# Patient Record
Sex: FEMALE | URBAN - METROPOLITAN AREA
[De-identification: names, ages, dates, MRNs, and addresses within clinical notes are randomized per-mention and may not be internally consistent; named-entity substitution may affect disease eponyms.]

---

## 2022-08-09 ENCOUNTER — OFFICE VISIT (OUTPATIENT)
Dept: URBAN - METROPOLITAN AREA CLINIC 46 | Facility: CLINIC | Age: 82
End: 2022-08-09
Payer: MEDICARE

## 2022-08-09 DIAGNOSIS — H16.223 KERATOCONJUNCTIVITIS SICCA, BILATERAL: ICD-10-CM

## 2022-08-09 DIAGNOSIS — H35.3131 NONEXUDATIVE MACULAR DEGENERATION, EARLY DRY STAGE, BILATERAL: ICD-10-CM

## 2022-08-09 DIAGNOSIS — H26.493 OTHER SECONDARY CATARACT, BILATERAL: ICD-10-CM

## 2022-08-09 DIAGNOSIS — H40.1131 PRIMARY OPEN-ANGLE GLAUCOMA, MILD STAGE, BILATERAL: Primary | ICD-10-CM

## 2022-08-09 PROCEDURE — 99214 OFFICE O/P EST MOD 30 MIN: CPT | Performed by: OPHTHALMOLOGY

## 2022-08-09 PROCEDURE — 92134 CPTRZ OPH DX IMG PST SGM RTA: CPT | Performed by: OPHTHALMOLOGY

## 2022-08-09 RX ORDER — LATANOPROST 50 UG/ML
0.005 % SOLUTION/ DROPS OPHTHALMIC
Refills: 0 | Status: INACTIVE
Start: 2022-08-09 | End: 2022-08-09

## 2022-08-09 RX ORDER — METOPROLOL SUCCINATE 100 MG/1
100 MG TABLET, FILM COATED, EXTENDED RELEASE ORAL
Refills: 0 | Status: ACTIVE
Start: 2022-08-09

## 2022-08-09 RX ORDER — LISINOPRIL 40 MG/1
40 MG TABLET ORAL
Refills: 0 | Status: ACTIVE
Start: 2022-08-09

## 2022-08-09 RX ORDER — LATANOPROST 50 UG/ML
0.005 % SOLUTION/ DROPS OPHTHALMIC
Qty: 2.5 | Refills: 11 | Status: ACTIVE
Start: 2022-08-09

## 2022-08-09 ASSESSMENT — INTRAOCULAR PRESSURE
OD: 12
OS: 11
OS: 12
OD: 13

## 2022-08-09 NOTE — IMPRESSION/PLAN
Impression: Keratoconjunctivitis sicca, bilateral: N63.264. Plan: Patient advised that dry eyes may be the cause of symptoms. Advised to use artificial tears as needed.

## 2022-08-09 NOTE — IMPRESSION/PLAN
Impression: Nonexudative macular degeneration, early dry stage, bilateral: H35.7534. Plan: Patient advised that their macular degeneration appears stable. They are advised to continue AREDS/AREDS2 and the [employer] grid. We will continue to monitor periodically; call if any changes noted.

## 2022-11-18 ENCOUNTER — OFFICE VISIT (OUTPATIENT)
Dept: URBAN - METROPOLITAN AREA CLINIC 46 | Facility: CLINIC | Age: 82
End: 2022-11-18
Payer: MEDICARE

## 2022-11-18 DIAGNOSIS — H40.1131 PRIMARY OPEN-ANGLE GLAUCOMA, MILD STAGE, BILATERAL: Primary | ICD-10-CM

## 2022-11-18 DIAGNOSIS — H26.493 OTHER SECONDARY CATARACT, BILATERAL: ICD-10-CM

## 2022-11-18 DIAGNOSIS — H16.223 KERATOCONJUNCTIVITIS SICCA, BILATERAL: ICD-10-CM

## 2022-11-18 PROCEDURE — 99212 OFFICE O/P EST SF 10 MIN: CPT | Performed by: OPHTHALMOLOGY

## 2022-11-18 ASSESSMENT — INTRAOCULAR PRESSURE
OS: 10
OS: 18
OD: 17
OD: 14

## 2022-11-18 NOTE — IMPRESSION/PLAN
Impression: Keratoconjunctivitis sicca, bilateral: D24.330. Plan: Patient advised that dry eyes may be the cause of symptoms. Advised to use artificial tears as needed.

## 2023-02-17 ENCOUNTER — OFFICE VISIT (OUTPATIENT)
Dept: URBAN - METROPOLITAN AREA CLINIC 46 | Facility: CLINIC | Age: 83
End: 2023-02-17
Payer: MEDICARE

## 2023-02-17 DIAGNOSIS — H40.1131 PRIMARY OPEN-ANGLE GLAUCOMA, MILD STAGE, BILATERAL: Primary | ICD-10-CM

## 2023-02-17 DIAGNOSIS — H16.223 KERATOCONJUNCTIVITIS SICCA, BILATERAL: ICD-10-CM

## 2023-02-17 DIAGNOSIS — H26.493 OTHER SECONDARY CATARACT, BILATERAL: ICD-10-CM

## 2023-02-17 PROCEDURE — 99213 OFFICE O/P EST LOW 20 MIN: CPT | Performed by: OPHTHALMOLOGY

## 2023-02-17 ASSESSMENT — INTRAOCULAR PRESSURE
OS: 12
OD: 12

## 2023-02-17 NOTE — IMPRESSION/PLAN
Impression: Keratoconjunctivitis sicca, bilateral: V55.024. Plan: Patient advised that dry eyes may be the cause of symptoms. Advised to use artificial tears as needed.

## 2023-05-05 ENCOUNTER — TESTING ONLY (OUTPATIENT)
Dept: URBAN - METROPOLITAN AREA CLINIC 46 | Facility: CLINIC | Age: 83
End: 2023-05-05
Payer: MEDICARE

## 2023-05-05 DIAGNOSIS — H40.1131 PRIMARY OPEN-ANGLE GLAUCOMA, BILATERAL, MILD STAGE: Primary | ICD-10-CM

## 2023-05-12 ENCOUNTER — OFFICE VISIT (OUTPATIENT)
Dept: URBAN - METROPOLITAN AREA CLINIC 46 | Facility: CLINIC | Age: 83
End: 2023-05-12
Payer: MEDICARE

## 2023-05-12 DIAGNOSIS — H02.834 DERMATOCHALASIS OF LEFT UPPER EYELID: ICD-10-CM

## 2023-05-12 DIAGNOSIS — H02.831 DERMATOCHALASIS OF RIGHT UPPER EYELID: ICD-10-CM

## 2023-05-12 DIAGNOSIS — H40.1131 PRIMARY OPEN-ANGLE GLAUCOMA, BILATERAL, MILD STAGE: Primary | ICD-10-CM

## 2023-05-12 PROCEDURE — 99213 OFFICE O/P EST LOW 20 MIN: CPT | Performed by: OPHTHALMOLOGY

## 2023-05-12 ASSESSMENT — INTRAOCULAR PRESSURE
OD: 14
OS: 12

## 2023-06-08 ENCOUNTER — TESTING ONLY (OUTPATIENT)
Dept: URBAN - METROPOLITAN AREA CLINIC 46 | Facility: CLINIC | Age: 83
End: 2023-06-08
Payer: MEDICARE

## 2023-06-08 DIAGNOSIS — H02.834 DERMATOCHALASIS OF LEFT UPPER EYELID: Primary | ICD-10-CM

## 2023-06-08 PROCEDURE — 92285 EXTERNAL OCULAR PHOTOGRAPHY: CPT | Performed by: OPHTHALMOLOGY

## 2023-06-19 ENCOUNTER — OFFICE VISIT (OUTPATIENT)
Dept: URBAN - METROPOLITAN AREA CLINIC 50 | Facility: CLINIC | Age: 83
End: 2023-06-19
Payer: MEDICARE

## 2023-06-19 DIAGNOSIS — H02.834 DERMATOCHALASIS OF LEFT UPPER EYELID: ICD-10-CM

## 2023-06-19 DIAGNOSIS — H02.831 DERMATOCHALASIS OF RIGHT UPPER EYELID: Primary | ICD-10-CM

## 2023-06-19 PROCEDURE — 92285 EXTERNAL OCULAR PHOTOGRAPHY: CPT | Performed by: OPHTHALMOLOGY

## 2023-06-19 PROCEDURE — 99213 OFFICE O/P EST LOW 20 MIN: CPT | Performed by: OPHTHALMOLOGY

## 2023-06-19 PROCEDURE — 92081 LIMITED VISUAL FIELD XM: CPT | Performed by: OPHTHALMOLOGY

## 2023-06-19 RX ORDER — NEOMYCIN SULFATE, POLYMYXIN B SULFATE AND DEXAMETHASONE 3.5; 10000; 1 MG/G; [USP'U]/G; MG/G
OINTMENT OPHTHALMIC
Qty: 3.5 | Refills: 0 | Status: ACTIVE
Start: 2023-06-19

## 2023-06-19 RX ORDER — CEPHALEXIN 500 MG/1
500 MG CAPSULE ORAL
Qty: 40 | Refills: 0 | Status: INACTIVE
Start: 2023-06-19 | End: 2023-06-28

## 2023-06-19 ASSESSMENT — INTRAOCULAR PRESSURE
OS: 14
OD: 14

## 2023-06-19 NOTE — IMPRESSION/PLAN
Impression: Dermatochalasis of right upper eyelid: H02.831. Plan: Visual field and clinical examination show significant impairment of visual field due to eyelid malposition. Further advised that they should expect to be bruised for about 2 weeks following surgery. They also should expect that there will be scarring. 

{sx Blepharoplasty both upper eyelids}

## 2024-08-06 ENCOUNTER — OFFICE VISIT (OUTPATIENT)
Dept: URBAN - METROPOLITAN AREA CLINIC 46 | Facility: CLINIC | Age: 84
End: 2024-08-06
Payer: MEDICARE

## 2024-08-06 DIAGNOSIS — H40.1131 PRIMARY OPEN-ANGLE GLAUCOMA, BILATERAL, MILD STAGE: Primary | ICD-10-CM

## 2024-08-06 DIAGNOSIS — H16.223 KERATOCONJUNCTIVITIS SICCA, BILATERAL: ICD-10-CM

## 2024-08-06 DIAGNOSIS — H26.493 OTHER SECONDARY CATARACT, BILATERAL: ICD-10-CM

## 2024-08-06 PROCEDURE — 92133 CPTRZD OPH DX IMG PST SGM ON: CPT | Performed by: OPTOMETRIST

## 2024-08-06 PROCEDURE — 99213 OFFICE O/P EST LOW 20 MIN: CPT | Performed by: OPTOMETRIST

## 2024-08-06 PROCEDURE — 92083 EXTENDED VISUAL FIELD XM: CPT | Performed by: OPTOMETRIST

## 2024-08-06 ASSESSMENT — INTRAOCULAR PRESSURE
OD: 14
OS: 14